# Patient Record
Sex: FEMALE | Race: OTHER | Employment: FULL TIME | URBAN - METROPOLITAN AREA
[De-identification: names, ages, dates, MRNs, and addresses within clinical notes are randomized per-mention and may not be internally consistent; named-entity substitution may affect disease eponyms.]

---

## 2019-08-27 ENCOUNTER — OFFICE VISIT (OUTPATIENT)
Dept: OBGYN CLINIC | Age: 34
End: 2019-08-27

## 2019-08-27 VITALS
BODY MASS INDEX: 24.66 KG/M2 | SYSTOLIC BLOOD PRESSURE: 115 MMHG | RESPIRATION RATE: 18 BRPM | HEART RATE: 80 BPM | HEIGHT: 65 IN | DIASTOLIC BLOOD PRESSURE: 72 MMHG | WEIGHT: 148 LBS

## 2019-08-27 DIAGNOSIS — N75.1 BARTHOLIN'S GLAND ABSCESS: Primary | ICD-10-CM

## 2019-08-27 RX ORDER — SULFAMETHOXAZOLE AND TRIMETHOPRIM 800; 160 MG/1; MG/1
1 TABLET ORAL 2 TIMES DAILY
Qty: 14 TAB | Refills: 0 | Status: SHIPPED | OUTPATIENT
Start: 2019-08-27 | End: 2019-09-03

## 2019-08-27 NOTE — PROGRESS NOTES
Subjective:      Patient complains of pain and swelling of the left labia. She states that she has had recurrent problems with a Bartholin's abscess at this site in the past, but that she has had no symptoms for the past 4 years. She denies any drainage or bleeding from the site. Current Outpatient Medications   Medication Sig Dispense Refill    trimethoprim-sulfamethoxazole (BACTRIM DS, SEPTRA DS) 160-800 mg per tablet Take 1 Tab by mouth two (2) times a day for 7 days. 14 Tab 0    acetaminophen-codeine (TYLENOL #3) 300-30 mg per tablet Take 1 Tab by mouth every four (4) hours as needed for Pain. Max Daily Amount: 6 Tabs. 30 Tab 0    traMADol (ULTRAM) 50 mg tablet Take 1 Tab by mouth every six (6) hours as needed for Pain. Max Daily Amount: 200 mg. 30 Tab 0    citalopram (CELEXA) 10 mg tablet Take 2 Tabs by mouth daily. 30 Tab 1    oxyCODONE-acetaminophen (PERCOCET) 5-325 mg per tablet Take 1 Tab by mouth every four (4) hours as needed. Max Daily Amount: 6 Tabs. 20 Tab 0    PRENATAL VIT/FE FUMARATE/FA (PRENATAL PO) Take  by mouth. Allergies   Allergen Reactions    Vicodin [Hydrocodone-Acetaminophen] Other (comments)     Dizzy,n/v     Past Medical History:   Diagnosis Date    Bartholin cyst     Breech presentation 6/5/2015    On 29 week US    Late prenatal care affecting pregnancy in second trimester, antepartum 6/2/2015    LGA (large for gestational age) fetus affecting mother, delivered 6/2/2015    Uterine size date discrepancy 6/2/2015     History reviewed. No pertinent surgical history. Family History   Problem Relation Age of Onset    Hypertension Mother      Social History     Tobacco Use    Smoking status: Never Smoker    Smokeless tobacco: Never Used   Substance Use Topics    Alcohol use: No      Review of Systems    A comprehensive review of systems was negative except for that written in the HPI.         Objective:     Visit Vitals  /72 (BP 1 Location: Left arm, BP Patient Position: Sitting)   Pulse 80   Resp 18   Ht 5' 5\" (1.651 m)   Wt 148 lb (67.1 kg)   LMP 08/16/2019   BMI 24.63 kg/m²     General appearance: alert, cooperative, no distress, appears stated age  Pelvic: Approximately 3 x 4 cm left Bartholin's abscess, tender and fluctuant, no drainage, no other vulvar abnormalities    Assessment/Plan:     Bartholin's gland abscess. Patient declines I&D. Rx Bactrim. Follow up 2 weeks for recheck and for annual exam.  Plan of care discussed. Patient expressed understanding.